# Patient Record
Sex: MALE | Race: WHITE | Employment: FULL TIME | URBAN - METROPOLITAN AREA
[De-identification: names, ages, dates, MRNs, and addresses within clinical notes are randomized per-mention and may not be internally consistent; named-entity substitution may affect disease eponyms.]

---

## 2022-06-07 ENCOUNTER — APPOINTMENT (OUTPATIENT)
Dept: CT IMAGING | Age: 32
End: 2022-06-07
Attending: INTERNAL MEDICINE
Payer: COMMERCIAL

## 2022-06-07 ENCOUNTER — HOSPITAL ENCOUNTER (OUTPATIENT)
Age: 32
Setting detail: OBSERVATION
Discharge: HOME | End: 2022-06-09
Attending: EMERGENCY MEDICINE | Admitting: INTERNAL MEDICINE
Payer: COMMERCIAL

## 2022-06-07 DIAGNOSIS — L03.113 CELLULITIS OF RIGHT UPPER EXTREMITY: Primary | ICD-10-CM

## 2022-06-07 PROBLEM — L03.90 CELLULITIS: Status: ACTIVE | Noted: 2022-06-07

## 2022-06-07 LAB
AMPHET UR QL SCN: NEGATIVE
ANION GAP SERPL CALC-SCNC: 8 MMOL/L (ref 5–15)
APPEARANCE UR: CLEAR
BACTERIA URNS QL MICRO: NEGATIVE /HPF
BARBITURATES UR QL SCN: NEGATIVE
BASOPHILS # BLD: 0.1 K/UL (ref 0–0.1)
BASOPHILS NFR BLD: 0 % (ref 0–1)
BENZODIAZ UR QL: NEGATIVE
BILIRUB UR QL: NEGATIVE
BUN SERPL-MCNC: 9 MG/DL (ref 6–20)
BUN/CREAT SERPL: 7 (ref 12–20)
CALCIUM SERPL-MCNC: 8.8 MG/DL (ref 8.5–10.1)
CANNABINOIDS UR QL SCN: NEGATIVE
CHLORIDE SERPL-SCNC: 106 MMOL/L (ref 97–108)
CO2 SERPL-SCNC: 24 MMOL/L (ref 21–32)
COCAINE UR QL SCN: NEGATIVE
COLOR UR: ABNORMAL
COMMENT, HOLDF: NORMAL
COMMENT, HOLDF: NORMAL
CREAT SERPL-MCNC: 1.22 MG/DL (ref 0.7–1.3)
DIFFERENTIAL METHOD BLD: ABNORMAL
DRUG SCRN COMMENT,DRGCM: NORMAL
EOSINOPHIL # BLD: 0.2 K/UL (ref 0–0.4)
EOSINOPHIL NFR BLD: 1 % (ref 0–7)
EPITH CASTS URNS QL MICRO: ABNORMAL /LPF
ERYTHROCYTE [DISTWIDTH] IN BLOOD BY AUTOMATED COUNT: 12.8 % (ref 11.5–14.5)
ETHANOL SERPL-MCNC: <10 MG/DL
GLUCOSE SERPL-MCNC: 109 MG/DL (ref 65–100)
GLUCOSE UR STRIP.AUTO-MCNC: NEGATIVE MG/DL
HCT VFR BLD AUTO: 38.4 % (ref 36.6–50.3)
HGB BLD-MCNC: 13.5 G/DL (ref 12.1–17)
HGB UR QL STRIP: NEGATIVE
HYALINE CASTS URNS QL MICRO: ABNORMAL /LPF (ref 0–2)
IMM GRANULOCYTES # BLD AUTO: 0.1 K/UL (ref 0–0.04)
IMM GRANULOCYTES NFR BLD AUTO: 0 % (ref 0–0.5)
KETONES UR QL STRIP.AUTO: NEGATIVE MG/DL
LEUKOCYTE ESTERASE UR QL STRIP.AUTO: ABNORMAL
LYMPHOCYTES # BLD: 1.6 K/UL (ref 0.8–3.5)
LYMPHOCYTES NFR BLD: 11 % (ref 12–49)
MCH RBC QN AUTO: 30.3 PG (ref 26–34)
MCHC RBC AUTO-ENTMCNC: 35.2 G/DL (ref 30–36.5)
MCV RBC AUTO: 86.1 FL (ref 80–99)
METHADONE UR QL: NEGATIVE
MONOCYTES # BLD: 1 K/UL (ref 0–1)
MONOCYTES NFR BLD: 7 % (ref 5–13)
NEUTS SEG # BLD: 11.6 K/UL (ref 1.8–8)
NEUTS SEG NFR BLD: 81 % (ref 32–75)
NITRITE UR QL STRIP.AUTO: NEGATIVE
NRBC # BLD: 0 K/UL (ref 0–0.01)
NRBC BLD-RTO: 0 PER 100 WBC
OPIATES UR QL: NEGATIVE
PCP UR QL: NEGATIVE
PH UR STRIP: 6.5 [PH] (ref 5–8)
PLATELET # BLD AUTO: 205 K/UL (ref 150–400)
PMV BLD AUTO: 10.6 FL (ref 8.9–12.9)
POTASSIUM SERPL-SCNC: 3.9 MMOL/L (ref 3.5–5.1)
PROCALCITONIN SERPL-MCNC: 0.3 NG/ML
PROT UR STRIP-MCNC: NEGATIVE MG/DL
RBC # BLD AUTO: 4.46 M/UL (ref 4.1–5.7)
RBC #/AREA URNS HPF: ABNORMAL /HPF (ref 0–5)
SAMPLES BEING HELD,HOLD: NORMAL
SAMPLES BEING HELD,HOLD: NORMAL
SODIUM SERPL-SCNC: 138 MMOL/L (ref 136–145)
SP GR UR REFRACTOMETRY: 1.01 (ref 1–1.03)
UROBILINOGEN UR QL STRIP.AUTO: 0.2 EU/DL (ref 0.2–1)
WBC # BLD AUTO: 14.5 K/UL (ref 4.1–11.1)
WBC URNS QL MICRO: ABNORMAL /HPF (ref 0–4)

## 2022-06-07 PROCEDURE — 73201 CT UPPER EXTREMITY W/DYE: CPT

## 2022-06-07 PROCEDURE — 87186 SC STD MICRODIL/AGAR DIL: CPT

## 2022-06-07 PROCEDURE — 96374 THER/PROPH/DIAG INJ IV PUSH: CPT

## 2022-06-07 PROCEDURE — 74011000250 HC RX REV CODE- 250: Performed by: INTERNAL MEDICINE

## 2022-06-07 PROCEDURE — 85025 COMPLETE CBC W/AUTO DIFF WBC: CPT

## 2022-06-07 PROCEDURE — 80307 DRUG TEST PRSMV CHEM ANLYZR: CPT

## 2022-06-07 PROCEDURE — 87147 CULTURE TYPE IMMUNOLOGIC: CPT

## 2022-06-07 PROCEDURE — 74011000636 HC RX REV CODE- 636: Performed by: RADIOLOGY

## 2022-06-07 PROCEDURE — G0378 HOSPITAL OBSERVATION PER HR: HCPCS

## 2022-06-07 PROCEDURE — 81001 URINALYSIS AUTO W/SCOPE: CPT

## 2022-06-07 PROCEDURE — 84145 PROCALCITONIN (PCT): CPT

## 2022-06-07 PROCEDURE — 82077 ASSAY SPEC XCP UR&BREATH IA: CPT

## 2022-06-07 PROCEDURE — 80048 BASIC METABOLIC PNL TOTAL CA: CPT

## 2022-06-07 PROCEDURE — 87086 URINE CULTURE/COLONY COUNT: CPT

## 2022-06-07 PROCEDURE — 36415 COLL VENOUS BLD VENIPUNCTURE: CPT

## 2022-06-07 PROCEDURE — 74011250636 HC RX REV CODE- 250/636: Performed by: INTERNAL MEDICINE

## 2022-06-07 PROCEDURE — 87077 CULTURE AEROBIC IDENTIFY: CPT

## 2022-06-07 PROCEDURE — 96375 TX/PRO/DX INJ NEW DRUG ADDON: CPT

## 2022-06-07 PROCEDURE — 74011250636 HC RX REV CODE- 250/636: Performed by: EMERGENCY MEDICINE

## 2022-06-07 PROCEDURE — 87205 SMEAR GRAM STAIN: CPT

## 2022-06-07 PROCEDURE — 99285 EMERGENCY DEPT VISIT HI MDM: CPT

## 2022-06-07 RX ORDER — HYDROMORPHONE HYDROCHLORIDE 1 MG/ML
1 INJECTION, SOLUTION INTRAMUSCULAR; INTRAVENOUS; SUBCUTANEOUS
Status: DISCONTINUED | OUTPATIENT
Start: 2022-06-07 | End: 2022-06-09 | Stop reason: HOSPADM

## 2022-06-07 RX ORDER — SODIUM CHLORIDE 9 MG/ML
75 INJECTION, SOLUTION INTRAVENOUS CONTINUOUS
Status: DISCONTINUED | OUTPATIENT
Start: 2022-06-07 | End: 2022-06-09

## 2022-06-07 RX ORDER — ACETAMINOPHEN 325 MG/1
650 TABLET ORAL
Status: DISCONTINUED | OUTPATIENT
Start: 2022-06-07 | End: 2022-06-09 | Stop reason: HOSPADM

## 2022-06-07 RX ORDER — ACETAMINOPHEN 650 MG/1
650 SUPPOSITORY RECTAL
Status: DISCONTINUED | OUTPATIENT
Start: 2022-06-07 | End: 2022-06-09 | Stop reason: HOSPADM

## 2022-06-07 RX ORDER — ONDANSETRON 2 MG/ML
4 INJECTION INTRAMUSCULAR; INTRAVENOUS
Status: DISCONTINUED | OUTPATIENT
Start: 2022-06-07 | End: 2022-06-09 | Stop reason: HOSPADM

## 2022-06-07 RX ORDER — ONDANSETRON 4 MG/1
4 TABLET, ORALLY DISINTEGRATING ORAL
Status: DISCONTINUED | OUTPATIENT
Start: 2022-06-07 | End: 2022-06-09 | Stop reason: HOSPADM

## 2022-06-07 RX ORDER — OXYCODONE HYDROCHLORIDE 5 MG/1
5 TABLET ORAL
Status: DISCONTINUED | OUTPATIENT
Start: 2022-06-07 | End: 2022-06-09 | Stop reason: HOSPADM

## 2022-06-07 RX ORDER — ENOXAPARIN SODIUM 100 MG/ML
30 INJECTION SUBCUTANEOUS EVERY 12 HOURS
Status: DISCONTINUED | OUTPATIENT
Start: 2022-06-07 | End: 2022-06-09 | Stop reason: HOSPADM

## 2022-06-07 RX ORDER — POLYETHYLENE GLYCOL 3350 17 G/17G
17 POWDER, FOR SOLUTION ORAL DAILY PRN
Status: DISCONTINUED | OUTPATIENT
Start: 2022-06-07 | End: 2022-06-09 | Stop reason: HOSPADM

## 2022-06-07 RX ADMIN — VANCOMYCIN HYDROCHLORIDE 2500 MG: 5 INJECTION, POWDER, LYOPHILIZED, FOR SOLUTION INTRAVENOUS at 16:06

## 2022-06-07 RX ADMIN — CEFEPIME HYDROCHLORIDE 2 G: 2 INJECTION, POWDER, FOR SOLUTION INTRAVENOUS at 18:14

## 2022-06-07 RX ADMIN — SODIUM CHLORIDE 75 ML/HR: 9 INJECTION, SOLUTION INTRAVENOUS at 18:14

## 2022-06-07 RX ADMIN — IOPAMIDOL 100 ML: 612 INJECTION, SOLUTION INTRAVENOUS at 20:40

## 2022-06-07 NOTE — ED NOTES
TRANSFER - OUT REPORT:    Verbal report given to artis(name) on Van Gay  being transferred to [facility](unit) for routine progression of care       Report consisted of patients Situation, Background, Assessment and   Recommendations(SBAR). Information from the following report(s) SBAR, Kardex, ED Summary, [facility] and Recent Results was reviewed with the receiving nurse. Lines:   Peripheral IV 06/07/22 Left Antecubital (Active)   Site Assessment Clean, dry, & intact 06/07/22 1531   Phlebitis Assessment 0 06/07/22 1531   Infiltration Assessment 0 06/07/22 1531   Dressing Status Clean, dry, & intact 06/07/22 1531   Dressing Type Transparent;Tape 06/07/22 1531   Hub Color/Line Status Green;Patent; Flushed 06/07/22 1531   Action Taken Blood drawn 06/07/22 1531        Opportunity for questions and clarification was provided.       Patient transported with:   [email or web address]

## 2022-06-07 NOTE — ED PROVIDER NOTES
Patient is a 26-year-old with a history of leg abscess and no history of diabetes who comes into the emergency department with an infection to his right upper extremity that has been gradually worsening over the course of the last 5 days despite taking Keflex and Bactrim. Patient reports that he developed a boil on his right forearm on Friday, picked at it and it worsened on Saturday, [facility]. Since then he has had worsening erythema, pain, and swelling with extension down to his hand. He also reports a fever of 103 yesterday. The history is provided by the patient. Skin Infection  This is a new problem. Episode onset: 5 days ago. The problem occurs constantly. The problem has been rapidly worsening. Treatments tried: has been on Keflex + Bactrim. The treatment provided no relief. No past medical history on file. No past surgical history on file. No family history on file. Social History     Socioeconomic History    Marital status: SINGLE     Spouse name: Not on file    Number of children: Not on file    Years of education: Not on file    Highest education level: Not on file   Occupational History    Not on file   Tobacco Use    Smoking status: Not on file    Smokeless tobacco: Not on file   Substance and Sexual Activity    Alcohol use: Not on file    Drug use: Not on file    Sexual activity: Not on file   Other Topics Concern    Not on file   Social History Narrative    Not on file     Social Determinants of Health     Financial Resource Strain:     Difficulty of Paying Living Expenses: Not on file   Food Insecurity:     Worried About Running Out of Food in the Last Year: Not on file    Roger of Food in the Last Year: Not on file   Transportation Needs:     Lack of Transportation (Medical): Not on file    Lack of Transportation (Non-Medical):  Not on file   Physical Activity:     Days of Exercise per Week: Not on file    Minutes of Exercise per Session: Not on file   Stress:     Feeling of Stress : Not on file   Social Connections:     Frequency of Communication with Friends and Family: Not on file    Frequency of Social Gatherings with Friends and Family: Not on file    Attends Hoahaoism Services: Not on file    Active Member of [address] [place] [address] or Organizations: Not on file    Attends Club or Organization Meetings: Not on file    Marital Status: Not on file   Intimate Partner Violence:     Fear of Current or Ex-Partner: Not on file    Emotionally Abused: Not on file    Physically Abused: Not on file    Sexually Abused: Not on file   Housing Stability:     Unable to Pay for Housing in the Last Year: Not on file    Number of [place] in the Last Year: Not on file    Unstable Housing in the Last Year: Not on file         ALLERGIES: Patient has no known allergies. Review of Systems   Constitutional: Positive for fever. Musculoskeletal: Positive for myalgias. Skin: Positive for rash and wound. All other systems reviewed and are negative. Vitals:    06/07/22 1506   BP: 132/85   Pulse: 86   Resp: 16   Temp: 98.3 °F (36.8 °C)   SpO2: 98%   Weight: 111.1 kg (245 lb)   Height: 5' 9\" (1.753 m)            Physical Exam  Vitals and nursing note reviewed. Constitutional:       General: He is not in acute distress. Appearance: Normal appearance. He is well-developed. HENT:      Head: Normocephalic and atraumatic. Eyes:      Conjunctiva/sclera: Conjunctivae normal.      Pupils: Pupils are equal, round, and reactive to light. Cardiovascular:      Rate and Rhythm: Normal rate and regular rhythm. Pulmonary:      Effort: Pulmonary effort is normal.      Breath sounds: Normal breath sounds. Abdominal:      General: There is no distension. Palpations: Abdomen is soft. Tenderness: There is no abdominal tenderness. Musculoskeletal:         General: Normal range of motion. Cervical back: Normal range of motion. Skin:     General: Skin is warm and dry. Capillary Refill: Capillary refill takes less than 2 seconds. Comments: Swelling and erythema to the right forearm with raised area of induration measuring approximately 4 cm across. There is an ulceration in the center of the indurated area draining serosanguineous fluid   Neurological:      General: No focal deficit present. Mental Status: He is alert and oriented to person, place, and time. Psychiatric:         Mood and Affect: Mood normal.         Behavior: Behavior normal.          MDM       Procedures        Patient is being admitted to the hospital.  The results of their tests and reasons for their admission have been discussed with them and/or available family. They convey agreement and understanding for the need to be admitted and for their admission diagnosis. Consultation will be made now with the inpatient physician for hospitalization. Perfect [facility] Consult for Admission  3:34 PM    ED Room Number: J92/V19  Patient Name and age: Jelena Jacques 27 y.o.  male  Working Diagnosis:   1.  Cellulitis of right upper extremity        COVID-19 Suspicion:  no  Sepsis present:  no  Reassessment needed: no  Code Status:  Full Code  Readmission: no  Isolation Requirements:  no  Recommended Level of Care:  med/surg  Department:[facility] - [phone]  Other:  Failed outpatient management

## 2022-06-07 NOTE — H&P
[address]  [place], [place] [address]  [phone]    Hospitalist Admission Note      NAME:  Darcy Herr   :   1990   MRN:  [id number]     PCP:  None     Date/Time of service:  2022 5:35 PM          Subjective:     CHIEF COMPLAINT: R arm pain     HISTORY OF PRESENT ILLNESS:     Mr. Berta López is a 27 y.o.  male who presented to the Emergency Department complaining of arm pain. Recent wound, now worsening. Had failed to improve on PO keflex and bactrim. More swollen. Hx of similar wounds. Not septic. We will admit him for observation. Past Medical History:   Diagnosis Date    Obese         No past surgical history on file. Social History     Tobacco Use    Smoking status: Current Every Day Smoker     Types: Cigarettes    Smokeless tobacco: Not on file   Substance Use Topics    Alcohol use: Not Currently        No family history on file.    Family hx cannot be fully assessed, since the patient cannot provide information    No Known Allergies     Prior to Admission medications    Not on File       Review of Systems:  (bold if positive, if negative)    Gen:  Eyes:  ENT:  CVS:  Pulm:  GI:  :  MS:  Skin:  Rash, erythema, abscess, woundPsych:  Endo:  Hem:  Renal:  Neuro:        Objective:      VITALS:    Vital signs reviewed; most recent are:    Visit Vitals  /85 (BP 1 Location: Right upper arm, BP Patient Position: Sitting)   Pulse 86   Temp 98.3 °F (36.8 °C)   Resp 16   Ht 5' 9\" (1.753 m)   Wt 111.1 kg (245 lb)   SpO2 98%   BMI 36.18 kg/m²     SpO2 Readings from Last 6 Encounters:   22 98%        No intake or output data in the 24 hours ending 22 0512     Exam:     Physical Exam:    Gen:  Well-developed, well-nourished, in no acute distress  HEENT:  Pink conjunctivae, PERRL, hearing intact to voice, moist mucous membranes  Neck:  Supple, without masses, thyroid non-tender  Resp:  No accessory muscle use, clear breath sounds without wheezes rales or rhonchi  Card:  No murmurs, normal S1, S2 without thrills, bruits or peripheral edema  Abd:  Soft, non-tender, non-distended, normoactive bowel sounds are present, no mass  Lymph:  No cervical or inguinal adenopathy  Musc:  No cyanosis or clubbing  Skin:  R forearm wound, edema, tender, skin turgor is good  Neuro:  Cranial nerves are grossly intact, no focal motor weakness, follows commands appropriately  Psych:  Good insight, oriented to person, place and time, alert     Labs:    Recent Labs     06/07/22  1522   WBC 14.5*   HGB 13.5   HCT 38.4        Recent Labs     06/07/22  1522      K 3.9      CO2 24   *   BUN 9   CREA 1.22   CA 8.8     No results found for: GLUCPOC  No results for input(s): PH, PCO2, PO2, HCO3, FIO2 in the last 72 hours. No results for input(s): INR, INREXT, INREXT in the last 72 hours. All Micro Results     Procedure Component Value Units Date/Time    CULTURE, URINE [[id number]] Collected: 06/07/22 1719    Order Status: Completed Specimen: Urine from Clean catch Updated: 06/07/22 1729    COVID-19 RAPID TEST [[id number]]     Order Status: Sent     CULTURE, MRSA [[id number]]     Order Status: Sent Specimen: Nasal from Nares           I have reviewed previous records       Assessment and Plan:      Cellulitis  /Leukocytosis - POA, failed outpateint treatment. CT to look for abscess. Gen surgery consulted for I&D evaluations. For now vanco and cefepime. MRSA screen. Home in AM.     Telemetry reviewed:   normal sinus rhythm    Risk of deterioration: medium      Total time spent with patient: 30 Minutes I personally reviewed chart, notes, data and current medications in the medical record. I have personally examined and treated the patient at bedside during this period. To assist coordination of care and communication with nursing and staff, this note may be preliminary early in the day, but finalized by end of the day.                  Care Plan discussed with: Patient, Nursing Staff and >50% of time spent in counseling and coordination of care    Discussed:  Care Plan and D/C Planning       ___________________________________________________    Attending Physician: Tor Day MD

## 2022-06-07 NOTE — PROGRESS NOTES
[address] [address] [facility] Pharmacy Progress Note: Antimicrobial Stewardship    Consult for antibiotic dosing of vancomycin by Dr. Lucho Pyle  Indication: cellulitis right upper extremity (failed outpatient Keflex and Bactrim)  Day of Therapy: 1    Plan:  Vancomycin therapy:   Start with loading dose of vancomycin 2500mg IV (25 mg/kg, max 2.5 gm)   Follow with maintenance dose of vancomycin 1000mg IV every 12 hours, predicts AUC of 406   Dose calculated to approximate a   Target AUC/RODNEY of 400-500   Trough of 10-15 mcg/mL. Plan:  Recommend level within 48 hours (not yet ordered). Pharmacy to follow daily and will make changes to dose and/or frequency based on clinical status. Date Dose & Interval Measured (mcg/mL) Extrapolated (mcg/mL)   ? ? ? ?   ? ? ? ?   ? ? ? ? Other Antimicrobial  (not dosed by pharmacist)   Cefepime 2g IV every 12 hours   Cultures     none   Serum Creatinine     Lab Results   Component Value Date/Time    Creatinine 1.22 06/07/2022 03:22 PM       Creatinine Clearance Estimated Creatinine Clearance: 104.9 mL/min (based on SCr of 1.22 mg/dL).      Procalcitonin  No results found for: PCT     Temp   98.3 °F (36.8 °C)    WBC   Lab Results   Component Value Date/Time    WBC 14.5 (H) 06/07/2022 03:22 PM       For Antifungals, Metronidazole and Nafcillin: No results found for: ALT, AST, AP, TBILI      Pharmacist: Signed Minh Foy

## 2022-06-07 NOTE — PROGRESS NOTES
Dear Dr Nikita Arthur,    The Lovenox dose/schedule was changed to reflect the new Enoxaparin Routine Prophylaxis Dosing guidelines which is based on the patient's weight and renal function. The dose/schedule was changed from 40mg sq q24h to 30mg sq bid. Thank you,    Lillie Campos. Chuyita, Pharm D.         Contact: [phone]

## 2022-06-08 LAB
ALBUMIN SERPL-MCNC: 2.9 G/DL (ref 3.5–5)
ALBUMIN/GLOB SERPL: 0.7 {RATIO} (ref 1.1–2.2)
ALP SERPL-CCNC: 63 U/L (ref 45–117)
ALT SERPL-CCNC: 40 U/L (ref 12–78)
ANION GAP SERPL CALC-SCNC: 8 MMOL/L (ref 5–15)
AST SERPL-CCNC: 24 U/L (ref 15–37)
BACTERIA SPEC CULT: NORMAL
BILIRUB SERPL-MCNC: 0.4 MG/DL (ref 0.2–1)
BUN SERPL-MCNC: 6 MG/DL (ref 6–20)
BUN/CREAT SERPL: 6 (ref 12–20)
CALCIUM SERPL-MCNC: 8.6 MG/DL (ref 8.5–10.1)
CHLORIDE SERPL-SCNC: 107 MMOL/L (ref 97–108)
CO2 SERPL-SCNC: 23 MMOL/L (ref 21–32)
COMMENT, HOLDF: NORMAL
CREAT SERPL-MCNC: 1.02 MG/DL (ref 0.7–1.3)
ERYTHROCYTE [DISTWIDTH] IN BLOOD BY AUTOMATED COUNT: 12.5 % (ref 11.5–14.5)
GLOBULIN SER CALC-MCNC: 3.9 G/DL (ref 2–4)
GLUCOSE SERPL-MCNC: 136 MG/DL (ref 65–100)
HAV IGM SER QL: NONREACTIVE
HBV CORE IGM SER QL: NONREACTIVE
HBV SURFACE AG SER QL: 0.29 INDEX
HBV SURFACE AG SER QL: NEGATIVE
HCT VFR BLD AUTO: 36.8 % (ref 36.6–50.3)
HCV AB SERPL QL IA: NONREACTIVE
HGB BLD-MCNC: 12.5 G/DL (ref 12.1–17)
MAGNESIUM SERPL-MCNC: 2.2 MG/DL (ref 1.6–2.4)
MCH RBC QN AUTO: 29.8 PG (ref 26–34)
MCHC RBC AUTO-ENTMCNC: 34 G/DL (ref 30–36.5)
MCV RBC AUTO: 87.6 FL (ref 80–99)
NRBC # BLD: 0 K/UL (ref 0–0.01)
NRBC BLD-RTO: 0 PER 100 WBC
PLATELET # BLD AUTO: 185 K/UL (ref 150–400)
PMV BLD AUTO: 10.1 FL (ref 8.9–12.9)
POTASSIUM SERPL-SCNC: 3.9 MMOL/L (ref 3.5–5.1)
PROT SERPL-MCNC: 6.8 G/DL (ref 6.4–8.2)
RBC # BLD AUTO: 4.2 M/UL (ref 4.1–5.7)
SAMPLES BEING HELD,HOLD: NORMAL
SERVICE CMNT-IMP: NORMAL
SODIUM SERPL-SCNC: 138 MMOL/L (ref 136–145)
SP1: NORMAL
SP2: NORMAL
SP3: NORMAL
WBC # BLD AUTO: 11.7 K/UL (ref 4.1–11.1)

## 2022-06-08 PROCEDURE — 80053 COMPREHEN METABOLIC PANEL: CPT

## 2022-06-08 PROCEDURE — G0378 HOSPITAL OBSERVATION PER HR: HCPCS

## 2022-06-08 PROCEDURE — 74011000258 HC RX REV CODE- 258: Performed by: INTERNAL MEDICINE

## 2022-06-08 PROCEDURE — 74011250637 HC RX REV CODE- 250/637: Performed by: INTERNAL MEDICINE

## 2022-06-08 PROCEDURE — 96376 TX/PRO/DX INJ SAME DRUG ADON: CPT

## 2022-06-08 PROCEDURE — 96375 TX/PRO/DX INJ NEW DRUG ADDON: CPT

## 2022-06-08 PROCEDURE — 85027 COMPLETE CBC AUTOMATED: CPT

## 2022-06-08 PROCEDURE — 74011250636 HC RX REV CODE- 250/636: Performed by: INTERNAL MEDICINE

## 2022-06-08 PROCEDURE — 36415 COLL VENOUS BLD VENIPUNCTURE: CPT

## 2022-06-08 PROCEDURE — 83735 ASSAY OF MAGNESIUM: CPT

## 2022-06-08 PROCEDURE — 80074 ACUTE HEPATITIS PANEL: CPT

## 2022-06-08 PROCEDURE — 74011000250 HC RX REV CODE- 250: Performed by: NURSE PRACTITIONER

## 2022-06-08 RX ORDER — LIDOCAINE HYDROCHLORIDE 10 MG/ML
20 INJECTION INFILTRATION; PERINEURAL ONCE
Status: COMPLETED | OUTPATIENT
Start: 2022-06-08 | End: 2022-06-08

## 2022-06-08 RX ADMIN — CEFEPIME 2 G: 20 INJECTION, POWDER, FOR SOLUTION INTRAVENOUS at 17:27

## 2022-06-08 RX ADMIN — VANCOMYCIN HYDROCHLORIDE 1000 MG: 1 INJECTION, POWDER, LYOPHILIZED, FOR SOLUTION INTRAVENOUS at 04:00

## 2022-06-08 RX ADMIN — HYDROMORPHONE HYDROCHLORIDE 1 MG: 1 INJECTION, SOLUTION INTRAMUSCULAR; INTRAVENOUS; SUBCUTANEOUS at 14:04

## 2022-06-08 RX ADMIN — HYDROMORPHONE HYDROCHLORIDE 1 MG: 1 INJECTION, SOLUTION INTRAMUSCULAR; INTRAVENOUS; SUBCUTANEOUS at 23:32

## 2022-06-08 RX ADMIN — CEFEPIME 2 G: 20 INJECTION, POWDER, FOR SOLUTION INTRAVENOUS at 05:00

## 2022-06-08 RX ADMIN — VANCOMYCIN HYDROCHLORIDE 1000 MG: 1 INJECTION, POWDER, LYOPHILIZED, FOR SOLUTION INTRAVENOUS at 16:18

## 2022-06-08 RX ADMIN — LIDOCAINE HYDROCHLORIDE 20 ML: 10 INJECTION, SOLUTION INFILTRATION; PERINEURAL at 13:00

## 2022-06-08 RX ADMIN — OXYCODONE 5 MG: 5 TABLET ORAL at 20:22

## 2022-06-08 NOTE — PROGRESS NOTES
[facility]  [address] [facility], [address] [place] [address]  [phone]      Medical Progress Note      NAME: Pamela Ovalle   :  1990  MRM:  [id number]    Date/Time of service: 2022  10:45 AM       Subjective:     Chief Complaint:  Patient was personally seen and examined by me during this time period. Chart reviewed. Still c/o swelling, pain, erythema of right arm       Objective:       Vitals:       Last 24hrs VS reviewed since prior progress note. Most recent are:    Visit Vitals  /80 (BP 1 Location: Left upper arm, BP Patient Position: At rest)   Pulse 87   Temp 97.9 °F (36.6 °C)   Resp 17   Ht 5' 9\" (1.753 m)   Wt 111.1 kg (245 lb)   SpO2 95%   BMI 36.18 kg/m²     SpO2 Readings from Last 6 Encounters:   22 95%            Intake/Output Summary (Last 24 hours) at 2022 1045  Last data filed at 2022 0859  Gross per 24 hour   Intake 1788.75 ml   Output --   Net 1788.75 ml        Exam:     Physical Exam:    Gen:  Well-developed, well-nourished, in no acute distress  HEENT:  Pink conjunctivae, PERRL, hearing intact to voice, moist mucous membranes  Neck:  Supple, without masses, thyroid non-tender  Resp:  No accessory muscle use, clear breath sounds without wheezes rales or rhonchi  Card:  No murmurs, normal S1, S2 without thrills, bruits or peripheral edema  Abd:  Soft, non-tender, non-distended, normoactive bowel sounds are present  Musc:  No cyanosis or clubbing  Skin:  Erythema, swelling of right forearm.   I&D area with some purulent drainage  Neuro:  Cranial nerves 3-12 are grossly intact, follows commands appropriately  Psych:  Good insight, oriented to person, place and time, alert    Medications Reviewed: (see below)    Lab Data Reviewed: (see below)    ______________________________________________________________________    Medications:     Current Facility-Administered Medications   Medication Dose Route Frequency    lidocaine (XYLOCAINE) 10 mg/mL (1 %) injection 20 mL  20 mL IntraDERMal ONCE    cefepime (MAXIPIME) 2 g in 0.9% sodium chloride (MBP/ADV) 100 mL MBP  2 g IntraVENous Q12H    acetaminophen (TYLENOL) tablet 650 mg  650 mg Oral Q6H PRN    Or    acetaminophen (TYLENOL) suppository 650 mg  650 mg Rectal Q6H PRN    polyethylene glycol (MIRALAX) packet 17 g  17 g Oral DAILY PRN    ondansetron (ZOFRAN ODT) tablet 4 mg  4 mg Oral Q8H PRN    Or    ondansetron (ZOFRAN) injection 4 mg  4 mg IntraVENous Q6H PRN    enoxaparin (LOVENOX) injection 30 mg  30 mg SubCUTAneous Q12H    0.9% sodium chloride infusion  75 mL/hr IntraVENous CONTINUOUS    vancomycin (VANCOCIN) 1,000 mg in 0.9% sodium chloride 250 mL (VIAL-MATE)  1,000 mg IntraVENous Q12H    oxyCODONE IR (ROXICODONE) tablet 5 mg  5 mg Oral Q4H PRN    HYDROmorphone (DILAUDID) injection 1 mg  1 mg IntraVENous Q4H PRN          Lab Review:     Recent Labs     06/08/22  0503 06/07/22  1522   WBC 11.7* 14.5*   HGB 12.5 13.5   HCT 36.8 38.4    205     Recent Labs     06/08/22  0503 06/07/22  1522    138   K 3.9 3.9    106   CO2 23 24   * 109*   BUN 6 9   CREA 1.02 1.22   CA 8.6 8.8   MG 2.2  --    ALB 2.9*  --    TBILI 0.4  --    ALT 40  --      No results found for: GLUCPOC       Assessment / Plan:     26 yo hx of morbid obesity, MRSA infection, presented w/ right arm cellulitis/abscess    1) R arm cellulitis/abscess: s/p bedside I&D by gen surg on 06/07. Awaiting cultures. Cont IV Vanc/cefepime    2) Acute R arm pain: due to above.   Use IV dilaudid prn severe pain    Total time spent with patient care: 30 Minutes **I personally saw and examined the patient during this time period**                 Care Plan discussed with: Patient, nursing     Discussed:  Care Plan    Prophylaxis:  Lovenox    Disposition:  Home w/Family           ___________________________________________________    Attending Physician: Clint Dixon MD

## 2022-06-08 NOTE — OP NOTES
Incision/Drainage Procedure Note    Performed By:  Rosa Diez MD     Assistant:  none    Anesthesia: None     Procedure Details: The risks,benefits and alternatives  were explained and consent was obtained for the procedure. RBAs also explained. The area was cleansed with Betadine and draped in the usual manner. An incision using a #10 scalpel was made. A moderate amount of pus was obtained. A sterile dressing was then applied. Estimated Blood Loss:  Minimal           Complications:  None; patient tolerated the procedure well.            Condition: stable

## 2022-06-08 NOTE — PROGRESS NOTES
Assessment / Plan:   26 yo with RUE abscess and cellulitis  Worsening cellulitis since last evening, purulent drainage from previous incision site  Will plan for further bedside drainage  Continue IV abx  [address], [place]  [facility]  [phone]        General Surgery Daily Progress Note      Patient: Van Gay MRN: [id number]  SSN: [id number]    YOB: 1990  Age: 27 y.o. Sex: male          Subjective:   Patient complains of ongoing pain and worsening swelling    Current Facility-Administered Medications   Medication Dose Route Frequency    [START ON 6/9/2022] Vancomycin Trough Level 03:30  1 Each Other ONCE    cefepime (MAXIPIME) 2 g in 0.9% sodium chloride (MBP/ADV) 100 mL MBP  2 g IntraVENous Q12H    acetaminophen (TYLENOL) tablet 650 mg  650 mg Oral Q6H PRN    Or    acetaminophen (TYLENOL) suppository 650 mg  650 mg Rectal Q6H PRN    polyethylene glycol (MIRALAX) packet 17 g  17 g Oral DAILY PRN    ondansetron (ZOFRAN ODT) tablet 4 mg  4 mg Oral Q8H PRN    Or    ondansetron (ZOFRAN) injection 4 mg  4 mg IntraVENous Q6H PRN    enoxaparin (LOVENOX) injection 30 mg  30 mg SubCUTAneous Q12H    0.9% sodium chloride infusion  75 mL/hr IntraVENous CONTINUOUS    vancomycin (VANCOCIN) 1,000 mg in 0.9% sodium chloride 250 mL (VIAL-MATE)  1,000 mg IntraVENous Q12H    oxyCODONE IR (ROXICODONE) tablet 5 mg  5 mg Oral Q4H PRN    HYDROmorphone (DILAUDID) injection 1 mg  1 mg IntraVENous Q4H PRN        Objective:   06/08 0701 - 06/08 1900  In: 480 [P.O.:480]  Out: -   06/06 1901 - 06/08 0700  In: 1548.8 [I.V.:1548.8]  Out: -   Patient Vitals for the past 8 hrs:   BP Temp Pulse Resp SpO2   06/08/22 1641 127/88 98 °F (36.7 °C) 89 17 96 %   06/08/22 1125 (!) 152/94 97.6 °F (36.4 °C) 83 17 97 %       Physical Exam:  General: Alert, cooperative, no distress, appears stated age. Neck:  Supple, symmetrical, trachea midline.   Lungs: Aerating well, no distress  Heart:  Regular rate and rhythm  Abdomen: Soft, non tender. Bowel sounds normal. No masses,  No organomegaly. Extremities: RUE with erythema to elbow and edema from elbow to hand, purulent drianage from incision site, tender to palpation.   Skin:  Skin color, texture, turgor normal. No rashes or lesions    Labs:   Recent Labs     06/08/22  0503   WBC 11.7*   HGB 12.5   HCT 36.8        Recent Labs     06/08/22  0503      K 3.9      CO2 23   *   BUN 6   CREA 1.02   CA 8.6   MG 2.2   ALB 2.9*   TBILI 0.4   ALT 40     ·     Active Problems:    Cellulitis (6/7/2022)        Problem List Items Addressed This Visit     None      Visit Diagnoses     Cellulitis of right upper extremity    -  Primary

## 2022-06-08 NOTE — PROGRESS NOTES
6/8/2022  12:37 PM  Reason for Admission:    Encounter Diagnoses     ICD-10-CM ICD-9-CM   1. Cellulitis of right upper extremity  L03.113 682.3                          RUR Score:          NA - OBS           Plan for utilizing home health:      No needs indicated. PCP: First and Last name:  None. No PCP    Current Advanced Directive/Advance Care Plan: Full Code      Healthcare Decision Maker: Tori Olson - parent  Click here to complete [address] including selection of the Healthcare Decision Maker Relationship (ie \"Primary\")                             Transition of Care Plan:                      1. Awaiting medical clearance and DC order. 2. Home with family assistance. 3. Outpatient follow-up. 4. Pt's family/self to transport.      Jarred Mendieta MA    Care Management Interventions  Support Systems: Spouse/Significant Other,Parent(s)  Discharge Location  Patient Expects to be Discharged to[de-identified] Home

## 2022-06-08 NOTE — PROGRESS NOTES
06/08/22      State Observation Letter was verbally explained to patient and provided in writing to patient.   The patient signed the document

## 2022-06-08 NOTE — PROGRESS NOTES
1900- Bedside and Verbal shift change report given to Cat RN (oncoming nurse) by Gin Del Angel (offgoing nurse). Report included the following information SBAR, Kardex, ED Summary, Intake/Output, MAR, Recent Results, Med Rec Status and Dual Neuro Assessment     Pt assessment Right forearm red and swollen, warm to the tough. Pt stated his arm is itching and scratched the scab off, Abscess now leaking purulent fluid. 2020- Pt picked up by transport to CT    2104- Consult to Gen Surgery called to [facility]    Preliminary CT results:  *Broad soft tissue edema dorsally with fluid layering superficial to muscular  fascia without organized abscess. 2123- Spoke with on-call MD.  Pt will be seen in the AM, NPO at midnight preliminary for possible procedure. [address] evelia from Surgery at bedside. Bedside procedure to drain arm was done. 0500-  Pt refused covid swab but allowed all other labs. ABX therapy and ice for swelling provided.

## 2022-06-08 NOTE — PROGRESS NOTES
Problem: Falls - Risk of  Goal: *Absence of Falls  Description: Document Julian Perez Fall Risk and appropriate interventions in the flowsheet.   Outcome: Progressing Towards Goal  Note: Fall Risk Interventions:            Medication Interventions: Patient to call before getting OOB,Evaluate medications/consider consulting pharmacy         History of Falls Interventions: Door open when patient unattended,Room close to nurse's station         Problem: Patient Education: Go to Patient Education Activity  Goal: Patient/Family Education  Outcome: Progressing Towards Goal     Problem: Infection - Risk of, Surgical Site Infection  Goal: *Absence of surgical site infection signs and symptoms  Outcome: Progressing Towards Goal     Problem: Patient Education: Go to Patient Education Activity  Goal: Patient/Family Education  Outcome: Progressing Towards Goal

## 2022-06-08 NOTE — CONSULTS
Surgery Consult    Subjective: Monisha Dee is a 27 y.o. male admitted for right arm cellulitis and abscess. Began a few days ago with a small bump. He tried popping it, but nothing came out. Over the last few days the area has increased in size and and tenderness. Subjective fever this evening. Admitted for abx, we are asked to eval for I&D. CT done without evidence of abscess. Past Medical History:   Diagnosis Date    Obese      No past surgical history on file. No family history on file.   Social History     Socioeconomic History    Marital status: SINGLE   Tobacco Use    Smoking status: Current Every Day Smoker     Types: Cigarettes   Substance and Sexual Activity    Alcohol use: Not Currently    Drug use: Not Currently      Current Facility-Administered Medications   Medication Dose Route Frequency Provider Last Rate Last Admin    [START ON 6/8/2022] cefepime (MAXIPIME) 2 g in 0.9% sodium chloride (MBP/ADV) 100 mL MBP  2 g IntraVENous Q12H Sudeep Lino MD        acetaminophen (TYLENOL) tablet 650 mg  650 mg Oral Q6H PRN Sudeep Lino MD        Or    acetaminophen (TYLENOL) suppository 650 mg  650 mg Rectal Q6H PRN Sudeep Lino MD        polyethylene glycol (MIRALAX) packet 17 g  17 g Oral DAILY PRN Sudeep Lino MD        ondansetron (ZOFRAN ODT) tablet 4 mg  4 mg Oral Q8H PRN Sudeep Lino MD        Or    ondansetron [facility]) injection 4 mg  4 mg IntraVENous Q6H PRN Sudeep Lino MD        enoxaparin (LOVENOX) injection 30 mg  30 mg SubCUTAneous Q12H Sudeep Lino MD        0.9% sodium chloride infusion  75 mL/hr IntraVENous CONTINUOUS Sudeep Lino MD 75 mL/hr at 06/07/22 1814 75 mL/hr at 06/07/22 1814    [START ON 6/8/2022] vancomycin (VANCOCIN) 1,000 mg in 0.9% sodium chloride 250 mL (VIAL-MATE)  1,000 mg IntraVENous Q12H Sudeep Lino MD        oxyCODONE IR (ROXICODONE) tablet 5 mg  5 mg Oral Q4H PRN Krystle Nj MD [place] HYDROmorphone (DILAUDID) injection 1 mg  1 mg IntraVENous Q4H PRN Husam Nj MD            No Known Allergies    Review of Systems:  A comprehensive review of systems was negative except for that written in the History of Present Illness. Objective:        Patient Vitals for the past 8 hrs:   BP Temp Pulse Resp SpO2   22 1811 (!) 168/85 98.1 °F (36.7 °C) 87 16 98 %       Temp (24hrs), Av.2 °F (36.8 °C), Min:98.1 °F (36.7 °C), Max:98.3 °F (36.8 °C)      Physical Exam:  GENERAL: alert, cooperative, no distress, appears stated age, EYE: negative findings: anicteric sclera, THROAT & NECK: mucous membranes moist, LUNG: no distress, symmetric expansion, ABDOMEN: normal findings: soft, non-tender, EXTREMITIES:  edema right upper  Extremity from elbow to hand, cap refill good, SKIN: dorsal right forearm with area of fluctuance , NEUROLOGIC: negative findings: alert, oriented x3, PSYCHIATRIC: non focal    Assessment:     Hospital Problems  Never Reviewed          Codes Class Noted POA    Cellulitis ICD-10-CM: L03.90  ICD-9-CM: 682.9  2022 Unknown              Plan:     Small subcutaneous abscess on right forearm. Performed I&D at bedside this evening. Recommend warm compresses to encourage drainage and antibiotics for cellulitis. Will follow.

## 2022-06-09 VITALS
WEIGHT: 245 LBS | RESPIRATION RATE: 18 BRPM | HEIGHT: 69 IN | HEART RATE: 73 BPM | DIASTOLIC BLOOD PRESSURE: 83 MMHG | SYSTOLIC BLOOD PRESSURE: 148 MMHG | OXYGEN SATURATION: 97 % | BODY MASS INDEX: 36.29 KG/M2 | TEMPERATURE: 98.7 F

## 2022-06-09 PROBLEM — L03.119 CELLULITIS AND ABSCESS OF UPPER EXTREMITY: Status: ACTIVE | Noted: 2022-06-09

## 2022-06-09 PROBLEM — L02.419 CELLULITIS AND ABSCESS OF UPPER EXTREMITY: Status: ACTIVE | Noted: 2022-06-09

## 2022-06-09 LAB
ANION GAP SERPL CALC-SCNC: 4 MMOL/L (ref 5–15)
BACTERIA SPEC CULT: NORMAL
BACTERIA SPEC CULT: NORMAL
BUN SERPL-MCNC: 8 MG/DL (ref 6–20)
BUN/CREAT SERPL: 9 (ref 12–20)
CALCIUM SERPL-MCNC: 8.9 MG/DL (ref 8.5–10.1)
CHLORIDE SERPL-SCNC: 107 MMOL/L (ref 97–108)
CO2 SERPL-SCNC: 27 MMOL/L (ref 21–32)
CREAT SERPL-MCNC: 0.89 MG/DL (ref 0.7–1.3)
DATE LAST DOSE: ABNORMAL
ERYTHROCYTE [DISTWIDTH] IN BLOOD BY AUTOMATED COUNT: 12.5 % (ref 11.5–14.5)
GLUCOSE SERPL-MCNC: 114 MG/DL (ref 65–100)
HCT VFR BLD AUTO: 38.3 % (ref 36.6–50.3)
HGB BLD-MCNC: 12.9 G/DL (ref 12.1–17)
MAGNESIUM SERPL-MCNC: 2.2 MG/DL (ref 1.6–2.4)
MCH RBC QN AUTO: 29.3 PG (ref 26–34)
MCHC RBC AUTO-ENTMCNC: 33.7 G/DL (ref 30–36.5)
MCV RBC AUTO: 86.8 FL (ref 80–99)
NRBC # BLD: 0 K/UL (ref 0–0.01)
NRBC BLD-RTO: 0 PER 100 WBC
PHOSPHATE SERPL-MCNC: 4.2 MG/DL (ref 2.6–4.7)
PLATELET # BLD AUTO: 214 K/UL (ref 150–400)
PMV BLD AUTO: 11 FL (ref 8.9–12.9)
POTASSIUM SERPL-SCNC: 4.5 MMOL/L (ref 3.5–5.1)
RBC # BLD AUTO: 4.41 M/UL (ref 4.1–5.7)
REPORTED DOSE,DOSE: ABNORMAL UNITS
REPORTED DOSE/TIME,TMG: 1600
SERVICE CMNT-IMP: NORMAL
SODIUM SERPL-SCNC: 138 MMOL/L (ref 136–145)
VANCOMYCIN TROUGH SERPL-MCNC: 3.1 UG/ML (ref 5–10)
WBC # BLD AUTO: 8.2 K/UL (ref 4.1–11.1)

## 2022-06-09 PROCEDURE — 83735 ASSAY OF MAGNESIUM: CPT

## 2022-06-09 PROCEDURE — 96376 TX/PRO/DX INJ SAME DRUG ADON: CPT

## 2022-06-09 PROCEDURE — 36415 COLL VENOUS BLD VENIPUNCTURE: CPT

## 2022-06-09 PROCEDURE — 74011250637 HC RX REV CODE- 250/637: Performed by: INTERNAL MEDICINE

## 2022-06-09 PROCEDURE — 80048 BASIC METABOLIC PNL TOTAL CA: CPT

## 2022-06-09 PROCEDURE — 84100 ASSAY OF PHOSPHORUS: CPT

## 2022-06-09 PROCEDURE — 74011000258 HC RX REV CODE- 258: Performed by: INTERNAL MEDICINE

## 2022-06-09 PROCEDURE — 85027 COMPLETE CBC AUTOMATED: CPT

## 2022-06-09 PROCEDURE — G0378 HOSPITAL OBSERVATION PER HR: HCPCS

## 2022-06-09 PROCEDURE — 74011250636 HC RX REV CODE- 250/636: Performed by: INTERNAL MEDICINE

## 2022-06-09 PROCEDURE — 80202 ASSAY OF VANCOMYCIN: CPT

## 2022-06-09 RX ORDER — OXYCODONE HYDROCHLORIDE 5 MG/1
5 TABLET ORAL
Qty: 12 TABLET | Refills: 0 | Status: SHIPPED | OUTPATIENT
Start: 2022-06-09 | End: 2022-06-14

## 2022-06-09 RX ORDER — AMOXICILLIN AND CLAVULANATE POTASSIUM 875; 125 MG/1; MG/1
1 TABLET, FILM COATED ORAL EVERY 12 HOURS
Qty: 14 TABLET | Refills: 0 | Status: SHIPPED | OUTPATIENT
Start: 2022-06-09 | End: 2022-06-16

## 2022-06-09 RX ORDER — DOXYCYCLINE 100 MG/1
100 CAPSULE ORAL 2 TIMES DAILY
Qty: 14 CAPSULE | Refills: 0 | Status: SHIPPED | OUTPATIENT
Start: 2022-06-09 | End: 2022-06-16

## 2022-06-09 RX ADMIN — VANCOMYCIN HYDROCHLORIDE 1000 MG: 1 INJECTION, POWDER, LYOPHILIZED, FOR SOLUTION INTRAVENOUS at 05:28

## 2022-06-09 RX ADMIN — ACETAMINOPHEN 650 MG: 325 TABLET ORAL at 08:46

## 2022-06-09 RX ADMIN — CEFEPIME 2 G: 20 INJECTION, POWDER, FOR SOLUTION INTRAVENOUS at 05:28

## 2022-06-09 NOTE — DISCHARGE SUMMARY
[facility] [address]  [address]  [phone]    Physician Discharge Summary     Patient ID:  Van Gay  [id number]  50 y.o.  1990    Admit date: 6/7/2022    Discharge date and time: 6/9/2022 10:25 AM    Admission Diagnoses: Cellulitis [L03.90]    Discharge Diagnoses:  Principal Diagnosis Cellulitis and abscess of upper extremity                                            Principal Problem:    Cellulitis and abscess of upper extremity (6/9/2022)    Active Problems:    Cellulitis (6/7/2022)           Hospital Course:     26 yo hx of morbid obesity, MRSA infection, presented w/ right arm cellulitis/abscess     1) R arm cellulitis/abscess: much improved. S/p bedside I&D by gen surg on 06/07 and 06/08. Awaiting cultures. Was on IV Vanc/cefepime. Will complete a course of Doxy/Augmentin. F/u with Gen surg     2) Acute R arm pain: due to above.   Use opiates prn    PCP: None     Consults: General Surgery    Significant Diagnostic Studies: I&D    Discharge Exam:  Physical Exam:    Gen: Well-developed, well-nourished, in no acute distress  HEENT:  Pink conjunctivae, PERRL, hearing intact to voice, moist mucous membranes  Neck: Supple, without masses, thyroid non-tender  Resp: No accessory muscle use, clear breath sounds without wheezes rales or rhonchi  Card: No murmurs, normal S1, S2 without thrills, bruits or peripheral edema  Abd:  Soft, non-tender, non-distended, normoactive bowel sounds are present  Lymph:  No cervical or inguinal adenopathy  Musc: No cyanosis or clubbing  Skin: R arm less swollen and erythematous   Neuro:  Cranial nerves are grossly intact, no focal motor weakness, follows commands appropriately  Psych:  Good insight, oriented to person, place and time, alert    Disposition: home  Discharge Condition: Stable    Patient Instructions:   Current Discharge Medication List      START taking these medications    Details   oxyCODONE IR (ROXICODONE) 5 mg immediate release tablet Take 1 Tablet by mouth every six (6) hours as needed for Pain for up to 5 days. Max Daily Amount: 20 mg. Indications: pain  Qty: 12 Tablet, Refills: 0    Associated Diagnoses: Cellulitis of right upper extremity      doxycycline (MONODOX) 100 mg capsule Take 1 Capsule by mouth two (2) times a day for 7 days. Qty: 14 Capsule, Refills: 0      amoxicillin-clavulanate (Augmentin) 875-125 mg per tablet Take 1 Tablet by mouth every twelve (12) hours for 7 days.   Qty: 14 Tablet, Refills: 0           Activity: Activity as tolerated  Diet: Regular Diet  Wound Care: As directed    Follow-up with  Follow-up Information     Follow up With Specialties Details Why Contact Info    Selma Martinez MD General Surgery Call in 1 week call to make appt for 1 week after discharge [facility]  [phone]            Follow-up tests/labs none    Signed:  Emelyn Long MD  6/9/2022  10:25 AM  **I personally spent 35 min on discharge**

## 2022-06-09 NOTE — PROGRESS NOTES
[address] Dosing Services: Antimicrobial Stewardship Daily Doc 6/9/2022     Consult for antibiotic dosing of Vancomycin by Dr. Shiloh Verma  Indication: Cellulitis right upper extremity (failed outpatient Keflex and Bactrim)  Day of Therapy: 3  - S/p I&D 6/7/22    Ht Readings from Last 1 Encounters:   06/07/22 175.3 cm (69\")        Wt Readings from Last 1 Encounters:   06/07/22 111.1 kg (245 lb)      Vancomycin therapy:  Current maintenance dose: vancomycin 1000 mg IV every 12 hours   Last level: 3.1 mcg/mL  Dose calculated to approximate a           a. Target AUC/RODNEY of 400-500          b. Trough of 10-15 mcg/mL     Plan: Vancomycin subtherapeutic due to renal fxn improvement. Will increase vancomycin to 1250 mg (~10 mg/kg) every 8 hours for eAUC 488 trough 13.9 mcg/ml based on bayesian kinetics model. Will order Css trough prior to 4th dose on adjusted regimen. Starting dose early d/t subtherapeutic level and dose + frequency increase. Dose administration notes:   Doses given appropriately as scheduled    Date Dose & Interval Measured (mcg/mL) Extrapolated (mcg/mL)   ?[id number] ?1000 mg Q12 hrs 3. 1? AUC ~270?   ? ? ? ?   ? ? ? ? Non-Kinetic Antimicrobial Dosing Regimen:   Current Regimen:  Ceftriaxone 2 grams Q24 hrs  Recommendation: Dose appropriate  Dose administration notes:   Doses given appropriately as scheduled    Other Antimicrobial   (not dosed by pharmacist) None   Cultures 6/8 MRSA: Pending  6/7 Wound: NGTD  6/7 Urine: Clear, UA neg bacteria   Serum Creatinine Lab Results   Component Value Date/Time    Creatinine 0.89 06/09/2022 05:28 AM         Creatinine Clearance Estimated Creatinine Clearance: 143.7 mL/min (based on SCr of 0.89 mg/dL).      Temp Temp: 97.6 °F (36.4 °C)       WBC Lab Results   Component Value Date/Time    WBC 8.2 06/09/2022 05:28 AM        Procalcitonin Lab Results   Component Value Date/Time    Procalcitonin 0.30 06/07/2022 05:19 PM        For Antifungals, Metronidazole and Nafcillin: Lab Results   Component Value Date/Time    ALT (SGPT) 40 06/08/2022 05:03 AM    AST (SGOT) 24 06/08/2022 05:03 AM    Alk.  phosphatase 63 06/08/2022 05:03 AM    Bilirubin, total 0.4 06/08/2022 05:03 AM        Thanks,  Pharmacist Clarice Trevino, PHARMD

## 2022-06-09 NOTE — PROGRESS NOTES
1900- Bedside and Verbal shift change report given to Cat RN (oncoming nurse) by [facility] (offgoing nurse). Report included the following information SBAR, Kardex, Procedure Summary, Intake/Output, MAR, Recent Results, Med Rec Status and Cardiac Rhythm nsr. 2000-  Breakthrough drainage noted. Dressing changed- packing not touched. Pain meds given, see MAR    0000- pt NPO    0700- Bedside and Verbal shift change report given to Gaetano Stanford (oncoming nurse) by Cat RN (offgoing nurse).  Report included the following information SBAR, Kardex, Procedure Summary, Intake/Output, Recent Results, Med Rec Status and Pre Procedure Checklist.

## 2022-06-09 NOTE — PROGRESS NOTES
6/9/2022  10:24 AM  Care Management Progress Note      ICD-10-CM ICD-9-CM    1. Cellulitis of right upper extremity  L03.113 682.3        RUR:  NA - OBS  Risk Level: [x]Low []Moderate []High  Value-based purchasing: [] Yes [x] No  Bundle patient: [] Yes [x] No   Specify:     Transition of care plan:  1. Discharge planned for today. Pt has medically cleared. 2. Home with family assistance as needed. 3. Outpatient follow-up. 4. Pt's family to transport.     Care Management Interventions  Support Systems: Spouse/Significant Other,Parent(s)  Discharge Location  Patient Expects to be Discharged to[de-identified] Home

## 2022-06-09 NOTE — PROGRESS NOTES
Problem: Falls - Risk of  Goal: *Absence of Falls  Description: Document [place] Fall Risk and appropriate interventions in the flowsheet.   Outcome: Progressing Towards Goal  Note: Fall Risk Interventions:            Medication Interventions: Bed/chair exit alarm         History of Falls Interventions: Door open when patient unattended         Problem: Patient Education: Go to Patient Education Activity  Goal: Patient/Family Education  Outcome: Progressing Towards Goal     Problem: Infection - Risk of, Surgical Site Infection  Goal: *Absence of surgical site infection signs and symptoms  Outcome: Progressing Towards Goal     Problem: Patient Education: Go to Patient Education Activity  Goal: Patient/Family Education  Outcome: Progressing Towards Goal

## 2022-06-09 NOTE — PROGRESS NOTES
Problem: Falls - Risk of  Goal: *Absence of Falls  Description: Document [facility] Fall Risk and appropriate interventions in the flowsheet.   Outcome: Progressing Towards Goal  Note: Fall Risk Interventions:            Medication Interventions: Bed/chair exit alarm         History of Falls Interventions: Door open when patient unattended         Problem: Patient Education: Go to Patient Education Activity  Goal: Patient/Family Education  Outcome: Progressing Towards Goal     Problem: Infection - Risk of, Surgical Site Infection  Goal: *Absence of surgical site infection signs and symptoms  Outcome: Progressing Towards Goal     Problem: Patient Education: Go to Patient Education Activity  Goal: Patient/Family Education  Outcome: Progressing Towards Goal

## 2022-06-09 NOTE — DISCHARGE INSTRUCTIONS
HOSPITALIST DISCHARGE INSTRUCTIONS  NAME: Jelena Jacques   :  1990   MRN:  [id number]     Date/Time:  2022 10:24 AM    ADMIT DATE: 2022     DISCHARGE DATE: 2022     ADMITTING DIAGNOSIS:  Right arm cellulitis and abscess    DISCHARGE DIAGNOSIS:  same    MEDICATIONS:  See after visit summary       · It is important that you take the medication exactly as they are prescribed. · Keep your medication in the bottles provided by the pharmacist and keep a list of the medication names, dosages, and times to be taken in your wallet. · Do not take other medications without consulting your doctor     Pain Management: per above medications    What to do at Home    Recommended diet:  Regular Diet    Recommended activity: See surgical instructions    1) Return to the hospital if you feel worse    2) If you experience any of the following symptoms then please call your primary care physician or return to the emergency room if you cannot get hold of your doctor:  Fever, chills, nausea, vomiting, diarrhea, change in mentation, falling, bleeding, shortness of breath, chest pain, severe headache, severe abdominal pain,     3) Follow up with General surgery     Follow Up: Follow-up Information     Follow up With Specialties Details Why Contact Info    Jennifer Handy MD General Surgery Call in 1 week call to make appt for 1 week after discharge [address] [place] [address]  [phone]              Information obtained by :  I understand that if any problems occur once I am at home I am to contact my physician. I understand and acknowledge receipt of the instructions indicated above.                                                                                                                                            Physician's or R.N.'s Signature                                                                  Date/Time Patient or Representative Signature                                                          Date/Time       WOUND CARE:  Tomorrow (Friday 6/10/22) remove packing from incision and can leave out. Just continue to cover incision with gauze. Can shower. Try to keep arm somewhat dry. Pat dry. Let wound air dry a bit then recover. Cellulitis: Care Instructions  Your Care Instructions     Cellulitis is a skin infection caused by bacteria, most often strep or staph. It often occurs after a break in the skin from a scrape, cut, bite, or puncture, or after a rash. Cellulitis may be treated without doing tests to find out what caused it. But your doctor may do tests, if needed, to look for a specific bacteria, like methicillin-resistant Staphylococcus aureus (MRSA). The doctor has checked you carefully, but problems can develop later. If you notice any problems or new symptoms, get medical treatment right away. Follow-up care is a key part of your treatment and safety. Be sure to make and go to all appointments, and call your doctor if you are having problems. It's also a good idea to know your test results and keep a list of the medicines you take. How can you care for yourself at home? · Take your antibiotics as directed. Do not stop taking them just because you feel better. You need to take the full course of antibiotics. · Prop up the infected area on pillows to reduce pain and swelling. Try to keep the area above the level of your heart as often as you can. · If your doctor told you how to care for your wound, follow your doctor's instructions. If you did not get instructions, follow this general advice:  ? Wash the wound with clean water 2 times a day. Don't use hydrogen peroxide or alcohol, which can slow healing.   ? You may cover the wound with a thin layer of petroleum jelly, such as Vaseline, and a nonstick bandage. ? Apply more petroleum jelly and replace the bandage as needed. · Be safe with medicines. Take pain medicines exactly as directed. ? If the doctor gave you a prescription medicine for pain, take it as prescribed. ? If you are not taking a prescription pain medicine, ask your doctor if you can take an over-the-counter medicine. To prevent cellulitis in the future  · Try to prevent cuts, scrapes, or other injuries to your skin. Cellulitis most often occurs where there is a break in the skin. · If you get a scrape, cut, mild burn, or bite, wash the wound with clean water as soon as you can to help avoid infection. Don't use hydrogen peroxide or alcohol, which can slow healing. · If you have swelling in your legs (edema), support stockings and good skin care may help prevent leg sores and cellulitis. · Take care of your feet, especially if you have diabetes or other conditions that increase the risk of infection. Wear shoes and socks. Do not go barefoot. If you have athlete's foot or other skin problems on your feet, talk to your doctor about how to treat them. When should you call for help? Call your doctor now or seek immediate medical care if:    · You have signs that your infection is getting worse, such as:  ? Increased pain, swelling, warmth, or redness. ? Red streaks leading from the area. ? Pus draining from the area. ? A fever.     · You get a rash. Watch closely for changes in your health, and be sure to contact your doctor if:    · You do not get better as expected. Where can you learn more? Go to http://www.gray.com/  Enter X309 in the search box to learn more about \"Cellulitis: Care Instructions. \"  Current as of: November 15, 2021               Content Version: 13.2  © 7520-4783 [employer]. Care instructions adapted under license by [employer] (which disclaims liability or warranty for this information).  If you have questions about a medical condition or this instruction, always ask your healthcare professional. [address] any warranty or liability for your use of this information. Patient Education        Skin Abscess: Care Instructions  Overview     A skin abscess is a bacterial infection that forms a pocket of pus. A boil is a kind of skin abscess. The doctor may have cut an opening in the abscess so that the pus can drain out. You may have gauze in the cut so that the abscess will stay open and keep draining. You may need antibiotics. You will need to follow up with your doctor to make sure the infection has gone away. The doctor has checked you carefully, but problems can develop later. If you notice any problems or new symptoms, get medical treatment right away. Follow-up care is a key part of your treatment and safety. Be sure to make and go to all appointments, and call your doctor if you are having problems. It's also a good idea to know your test results and keep a list of the medicines you take. How can you care for yourself at home? · Apply warm and dry compresses, a heating pad set on low, or a hot water bottle 3 or 4 times a day for pain. Keep a cloth between the heat source and your skin. · If your doctor prescribed antibiotics, take them as directed. Do not stop taking them just because you feel better. You need to take the full course of antibiotics. · Take pain medicines exactly as directed. ? If the doctor gave you a prescription medicine for pain, take it as prescribed. ? If you are not taking a prescription pain medicine, ask your doctor if you can take an over-the-counter medicine. · Keep your bandage clean and dry. Change the bandage whenever it gets wet or dirty, or at least one time a day. · If the abscess was packed with gauze:  ? Keep follow-up appointments to have the gauze changed or removed.  If the doctor instructed you to remove the gauze, follow the instructions you were given for how to remove it. ? After the gauze is removed, soak the area in warm water for 15 to 20 minutes 2 times a day, until the wound closes. When should you call for help? Call your doctor now or seek immediate medical care if:    · You have signs of worsening infection, such as:  ? Increased pain, swelling, warmth, or redness. ? Red streaks leading from the infected skin. ? Pus draining from the wound. ? A fever. Watch closely for changes in your health, and be sure to contact your doctor if:    · You do not get better as expected. Where can you learn more? Go to http://www.gray.com/  Enter Y429 in the search box to learn more about \"Skin Abscess: Care Instructions. \"  Current as of: November 15, 2021               Content Version: 13.2  © 1121-0848 [employer]. Care instructions adapted under license by [employer] (which disclaims liability or warranty for this information). If you have questions about a medical condition or this instruction, always ask your healthcare professional. [address] any warranty or liability for your use of this information.

## 2022-06-09 NOTE — PROGRESS NOTES
I have reviewed discharge instructions with the patient. The patient verbalized understanding. Opportunity given to ask questions, one IV removed without difficulty.

## 2022-06-09 NOTE — PROGRESS NOTES
Assessment / Plan:   26 yo with RUE abscess and cellulitis  Improved cellulitis since I&D yesterday- less pain/edema, less purulent drainage from incision   No further surgical interventions needed  Can have regular diet  Cont PO Antibx outpatient to cover MRSA  Will not need HH- can remove packing tomorrow and just cover incision  Keep elevating as much as possible  OK to DC from surgical standpoint. Pt to follow up with Dr Iris Toledo in 1 week    Andrea Alejandro NP          General Surgery Daily Progress Note      Patient: Missy Suggs MRN: [id number]  SSN: [id number]    YOB: 1990  Age: 27 y.o. Sex: male          Subjective:   Patient states feeling much better    Current Facility-Administered Medications   Medication Dose Route Frequency    vancomycin (VANCOCIN) 1,250 mg in 0.9% sodium chloride 250 mL (VIAL-MATE)  1,250 mg IntraVENous Q8H    [START ON 6/10/2022] Vancomycin level 6/10 prior to 1200 dose   Other ONCE    cefTRIAXone (ROCEPHIN) 2 g in 0.9% sodium chloride 20 mL IV syringe  2 g IntraVENous Q24H    cefepime (MAXIPIME) 2 g in 0.9% sodium chloride (MBP/ADV) 100 mL MBP  2 g IntraVENous Q12H    acetaminophen (TYLENOL) tablet 650 mg  650 mg Oral Q6H PRN    Or    acetaminophen (TYLENOL) suppository 650 mg  650 mg Rectal Q6H PRN    polyethylene glycol (MIRALAX) packet 17 g  17 g Oral DAILY PRN    ondansetron (ZOFRAN ODT) tablet 4 mg  4 mg Oral Q8H PRN    Or    ondansetron (ZOFRAN) injection 4 mg  4 mg IntraVENous Q6H PRN    enoxaparin (LOVENOX) injection 30 mg  30 mg SubCUTAneous Q12H    oxyCODONE IR (ROXICODONE) tablet 5 mg  5 mg Oral Q4H PRN    HYDROmorphone (DILAUDID) injection 1 mg  1 mg IntraVENous Q4H PRN        Objective:   No intake/output data recorded. 06/07 1901 - 06/09 0700  In: 4141.3 [P.O.:730;  I.V.:3411.3]  Out: -   Patient Vitals for the past 8 hrs:   BP Temp Pulse Resp SpO2   06/09/22 0840 -- -- -- -- 97 %   06/09/22 0735 (!) 138/92 97.6 °F (36.4 °C) 63 18 97 %       Physical Exam:  General: Alert, cooperative, no distress, appears stated age. Neck:  Supple, symmetrical, trachea midline. Lungs: Aerating well, no distress  Heart:  Regular rate and rhythm  Abdomen: Soft, non tender. Bowel sounds normal. No masses,  No organomegaly. Extremities: RUE with erythema to elbow and edema from elbow to hand decreased, drianage from incision site also less purulent, tender to palpation decreasing. Skin:  Skin color, texture, turgor normal. No rashes or lesions    Labs:   Recent Labs     06/09/22  0528   WBC 8.2   HGB 12.9   HCT 38.3        Recent Labs     06/09/22  0528 06/08/22  0503 06/08/22  0503      < > 138   K 4.5   < > 3.9      < > 107   CO2 27   < > 23   *   < > 136*   BUN 8   < > 6   CREA 0.89   < > 1.02   CA 8.9   < > 8.6   MG 2.2   < > 2.2   PHOS 4.2  --   --    ALB  --   --  2.9*   TBILI  --   --  0.4   ALT  --   --  40    < > = values in this interval not displayed.      ·     Active Problems:    Cellulitis (6/7/2022)        Problem List Items Addressed This Visit     None      Visit Diagnoses     Cellulitis of right upper extremity    -  Primary

## 2022-06-10 LAB
BACTERIA SPEC CULT: ABNORMAL
BACTERIA SPEC CULT: ABNORMAL
GRAM STN SPEC: ABNORMAL
GRAM STN SPEC: ABNORMAL
SERVICE CMNT-IMP: ABNORMAL

## 2022-08-08 ENCOUNTER — HOSPITAL ENCOUNTER (EMERGENCY)
Age: 32
Discharge: HOME | End: 2022-08-08
Attending: EMERGENCY MEDICINE
Payer: COMMERCIAL

## 2022-08-08 VITALS
DIASTOLIC BLOOD PRESSURE: 105 MMHG | TEMPERATURE: 97.7 F | WEIGHT: 250 LBS | BODY MASS INDEX: 37.03 KG/M2 | HEART RATE: 79 BPM | RESPIRATION RATE: 16 BRPM | HEIGHT: 69 IN | SYSTOLIC BLOOD PRESSURE: 158 MMHG | OXYGEN SATURATION: 97 %

## 2022-08-08 DIAGNOSIS — H10.32 ACUTE CONJUNCTIVITIS OF LEFT EYE, UNSPECIFIED ACUTE CONJUNCTIVITIS TYPE: Primary | ICD-10-CM

## 2022-08-08 PROCEDURE — 74011250637 HC RX REV CODE- 250/637: Performed by: EMERGENCY MEDICINE

## 2022-08-08 PROCEDURE — 99283 EMERGENCY DEPT VISIT LOW MDM: CPT

## 2022-08-08 RX ORDER — ERYTHROMYCIN 5 MG/G
OINTMENT OPHTHALMIC
Status: COMPLETED | OUTPATIENT
Start: 2022-08-08 | End: 2022-08-08

## 2022-08-08 RX ORDER — ERYTHROMYCIN 5 MG/G
1 OINTMENT OPHTHALMIC EVERY 6 HOURS
Qty: 3.5 G | Refills: 0 | Status: SHIPPED | OUTPATIENT
Start: 2022-08-08 | End: 2022-08-15

## 2022-08-08 RX ADMIN — ERYTHROMYCIN: 5 OINTMENT OPHTHALMIC at 16:40

## 2022-08-08 NOTE — ED PROVIDER NOTES
47-year-old male with no significant past medical history, who wears no contacts or glasses, presents to the emergency department noting a 3-day history of left eye redness and irritation. He denies any foreign bodies in his eye or injuries to his eye. He does note recent sick contact last week with one of his kids at home who had a upper respiratory virus that was transient but did not have any eye issues with his symptoms. He states that he has developed some increased lymphadenopathy in his neck and the left side of his face. Denies any involvement of his right eye. Denies any significant purulent drainage from his eye but has had increased tearing from the eye. Denies any significant visual changes, fever, chills. He does note some chronic seasonal allergy rhinorrhea symptoms but denies any significant worsening or any other URI symptoms. Eye Problem   Associated symptoms include discharge and eye redness. Pertinent negatives include no numbness, no photophobia, no nausea, no vomiting, no weakness and no fever. Past Medical History:   Diagnosis Date    Obese        No past surgical history on file. No family history on file.     Social History     Socioeconomic History    Marital status: SINGLE     Spouse name: Not on file    Number of children: Not on file    Years of education: Not on file    Highest education level: Not on file   Occupational History    Not on file   Tobacco Use    Smoking status: Every Day     Types: Cigarettes    Smokeless tobacco: Not on file   Substance and Sexual Activity    Alcohol use: Not Currently    Drug use: Not Currently    Sexual activity: Not on file   Other Topics Concern    Not on file   Social History Narrative    Not on file     Social Determinants of Health     Financial Resource Strain: Not on file   Food Insecurity: Not on file   Transportation Needs: Not on file   Physical Activity: Not on file   Stress: Not on file   Social Connections: Not on file   Intimate Partner Violence: Not on file   Housing Stability: Not on file         ALLERGIES: Patient has no known allergies. Review of Systems   Constitutional:  Negative for activity change, appetite change, chills and fever. HENT:  Positive for rhinorrhea. Negative for congestion, sinus pain, sneezing and sore throat. Eyes:  Positive for discharge and redness. Negative for photophobia and visual disturbance. Respiratory:  Negative for cough and shortness of breath. Cardiovascular:  Negative for chest pain. Gastrointestinal:  Negative for abdominal pain, blood in stool, constipation, diarrhea, nausea and vomiting. Genitourinary:  Negative for difficulty urinating, dysuria, flank pain, hematuria, penile pain and testicular pain. Musculoskeletal:  Negative for arthralgias, back pain, myalgias and neck pain. Skin:  Negative for rash and wound. Neurological:  Negative for syncope, weakness, light-headedness, numbness and headaches. Hematological:  Positive for adenopathy. Psychiatric/Behavioral:  Negative for self-injury and suicidal ideas. All other systems reviewed and are negative. Vitals:    08/08/22 1547 08/08/22 1549   BP:  (!) 158/105   Pulse: 79    Resp: 16    Temp: 97.7 °F (36.5 °C)    SpO2: 97%    Weight: 113.4 kg (250 lb)    Height: 5' 9\" (1.753 m)             Physical Exam  Vitals and nursing note reviewed. Constitutional:       General: He is not in acute distress. Appearance: Normal appearance. He is well-developed. He is obese. He is not diaphoretic. HENT:      Head: Normocephalic and atraumatic. Nose: Nose normal.   Eyes:      General: Lids are normal. Gaze aligned appropriately. Extraocular Movements: Extraocular movements intact. Conjunctiva/sclera:      Left eye: Left conjunctiva is injected. No hemorrhage. Pupils: Pupils are equal, round, and reactive to light.       Slit lamp exam:     Left eye: No hyphema or hypopyon. Comments: 20/40 visual daily bilaterally and in each eye individually. Cardiovascular:      Rate and Rhythm: Normal rate and regular rhythm. Heart sounds: Normal heart sounds. Pulmonary:      Effort: Pulmonary effort is normal.      Breath sounds: Normal breath sounds. Abdominal:      General: There is no distension. Palpations: Abdomen is soft. Tenderness: There is no abdominal tenderness. Musculoskeletal:         General: No tenderness. Cervical back: Neck supple. Skin:     General: Skin is warm and dry. Neurological:      General: No focal deficit present. Mental Status: He is alert and oriented to person, place, and time. Cranial Nerves: No cranial nerve deficit. Sensory: No sensory deficit. Motor: No weakness. Coordination: Coordination normal.        MDM    75-year-old male presents with left eye conjunctivitis. Given recent sick contact with child at home, possible viral etiology but given asymmetry, possible bacterial etiology. Will treat with erythromycin ointment and recommended ophthalmology follow-up for further evaluation as needed. Return precautions were given for worsening or concerns. This plan was discussed with the patient at the bedside and he stated both understanding and agreement. Please note that this dictation was completed with [email or web address], the [employer] voice recognition software. Quite often unanticipated grammatical, syntax, homophones, and other interpretive errors are inadvertently transcribed by the computer software. Please disregard these errors. Please excuse any errors that have escaped final proofreading.       Procedures

## 2022-08-08 NOTE — ED TRIAGE NOTES
Pt arrives to the ER for complaints redness to left eye, symptoms started on Friday. Denies any drainage or crusting to eye.